# Patient Record
Sex: MALE | URBAN - METROPOLITAN AREA
[De-identification: names, ages, dates, MRNs, and addresses within clinical notes are randomized per-mention and may not be internally consistent; named-entity substitution may affect disease eponyms.]

---

## 2020-11-16 ENCOUNTER — NEW PATIENT (OUTPATIENT)
Dept: URBAN - METROPOLITAN AREA CLINIC 44 | Facility: CLINIC | Age: 80
End: 2020-11-16
Payer: MEDICARE

## 2020-11-16 DIAGNOSIS — H25.813 COMBINED FORMS OF AGE-RELATED CATARACT, BILATERAL: ICD-10-CM

## 2020-11-16 DIAGNOSIS — H16.143 PUNCTATE KERATITIS, BILATERAL: ICD-10-CM

## 2020-11-16 PROCEDURE — 92133 CPTRZD OPH DX IMG PST SGM ON: CPT | Performed by: OPTOMETRIST

## 2020-11-16 PROCEDURE — 92004 COMPRE OPH EXAM NEW PT 1/>: CPT | Performed by: OPTOMETRIST

## 2020-11-16 RX ORDER — LATANOPROST 50 UG/ML
0.005 % SOLUTION OPHTHALMIC
Qty: 1 | Refills: 1 | Status: INACTIVE
Start: 2020-11-16 | End: 2021-01-08

## 2020-11-16 ASSESSMENT — INTRAOCULAR PRESSURE
OD: 27
OS: 21
OS: 24
OD: 26

## 2020-11-16 ASSESSMENT — KERATOMETRY
OD: 42.50
OS: 43.00

## 2020-11-16 ASSESSMENT — VISUAL ACUITY
OD: 20/25
OS: 20/20

## 2020-11-30 ENCOUNTER — NEW PATIENT (OUTPATIENT)
Dept: URBAN - METROPOLITAN AREA CLINIC 44 | Facility: CLINIC | Age: 80
End: 2020-11-30
Payer: MEDICARE

## 2020-11-30 DIAGNOSIS — H35.82 RETINAL ISCHEMIA: ICD-10-CM

## 2020-11-30 DIAGNOSIS — H04.123 TEAR FILM INSUFFICIENCY OF BILATERAL LACRIMAL GLANDS: ICD-10-CM

## 2020-11-30 PROCEDURE — 92020 GONIOSCOPY: CPT | Performed by: OPHTHALMOLOGY

## 2020-11-30 PROCEDURE — 76514 ECHO EXAM OF EYE THICKNESS: CPT | Performed by: OPHTHALMOLOGY

## 2020-11-30 PROCEDURE — 92083 EXTENDED VISUAL FIELD XM: CPT | Performed by: OPHTHALMOLOGY

## 2020-11-30 PROCEDURE — 92004 COMPRE OPH EXAM NEW PT 1/>: CPT | Performed by: OPHTHALMOLOGY

## 2020-11-30 RX ORDER — PREDNISOLONE ACETATE 10 MG/ML
1 % SUSPENSION/ DROPS OPHTHALMIC
Qty: 1 | Refills: 1 | Status: INACTIVE
Start: 2020-11-30 | End: 2021-01-08

## 2020-11-30 RX ORDER — DORZOLAMIDE HYDROCHLORIDE AND TIMOLOL MALEATE 20; 5 MG/ML; MG/ML
SOLUTION/ DROPS OPHTHALMIC
Qty: 3 | Refills: 1 | Status: INACTIVE
Start: 2020-11-30 | End: 2021-01-08

## 2020-11-30 ASSESSMENT — INTRAOCULAR PRESSURE
OS: 20
OD: 23

## 2020-12-04 ENCOUNTER — Encounter (OUTPATIENT)
Dept: URBAN - METROPOLITAN AREA CLINIC 44 | Facility: CLINIC | Age: 80
End: 2020-12-04
Payer: MEDICARE

## 2020-12-04 DIAGNOSIS — Z01.818 ENCOUNTER FOR OTHER PREPROCEDURAL EXAMINATION: Primary | ICD-10-CM

## 2020-12-04 PROCEDURE — 99213 OFFICE O/P EST LOW 20 MIN: CPT | Performed by: PHYSICIAN ASSISTANT

## 2020-12-10 ENCOUNTER — SURGERY (OUTPATIENT)
Dept: URBAN - METROPOLITAN AREA SURGERY 19 | Facility: SURGERY | Age: 80
End: 2020-12-10
Payer: MEDICARE

## 2020-12-10 PROCEDURE — 66761 REVISION OF IRIS: CPT | Performed by: OPHTHALMOLOGY

## 2020-12-17 ENCOUNTER — SURGERY (OUTPATIENT)
Dept: URBAN - METROPOLITAN AREA SURGERY 19 | Facility: SURGERY | Age: 80
End: 2020-12-17
Payer: MEDICARE

## 2020-12-17 PROCEDURE — 66761 REVISION OF IRIS: CPT | Performed by: OPHTHALMOLOGY

## 2021-01-08 ENCOUNTER — FOLLOW UP ESTABLISHED (OUTPATIENT)
Dept: URBAN - METROPOLITAN AREA CLINIC 44 | Facility: CLINIC | Age: 81
End: 2021-01-08
Payer: MEDICARE

## 2021-01-08 PROCEDURE — 92012 INTRM OPH EXAM EST PATIENT: CPT | Performed by: OPHTHALMOLOGY

## 2021-01-08 RX ORDER — DORZOLAMIDE HYDROCHLORIDE AND TIMOLOL MALEATE 20; 5 MG/ML; MG/ML
SOLUTION/ DROPS OPHTHALMIC
Qty: 15 | Refills: 1 | Status: INACTIVE
Start: 2021-01-08 | End: 2021-07-27

## 2021-01-08 RX ORDER — LATANOPROST 50 UG/ML
0.005 % SOLUTION OPHTHALMIC
Qty: 5 | Refills: 1 | Status: INACTIVE
Start: 2021-01-08 | End: 2021-07-27

## 2021-01-08 ASSESSMENT — INTRAOCULAR PRESSURE
OS: 25
OD: 28

## 2021-02-05 ENCOUNTER — FOLLOW UP ESTABLISHED (OUTPATIENT)
Dept: URBAN - METROPOLITAN AREA CLINIC 44 | Facility: CLINIC | Age: 81
End: 2021-02-05
Payer: MEDICARE

## 2021-02-05 PROCEDURE — 92012 INTRM OPH EXAM EST PATIENT: CPT | Performed by: OPHTHALMOLOGY

## 2021-02-05 ASSESSMENT — INTRAOCULAR PRESSURE
OS: 15
OD: 14

## 2021-02-10 ENCOUNTER — NEW PATIENT (OUTPATIENT)
Dept: URBAN - METROPOLITAN AREA CLINIC 44 | Facility: CLINIC | Age: 81
End: 2021-02-10
Payer: MEDICARE

## 2021-02-10 DIAGNOSIS — H40.2234 CHRONIC ANGLE-CLOSURE GLAUCOMA, BILATERAL, INDETERMINATE STAGE: Primary | ICD-10-CM

## 2021-02-10 PROCEDURE — 92134 CPTRZ OPH DX IMG PST SGM RTA: CPT | Performed by: OPHTHALMOLOGY

## 2021-02-10 PROCEDURE — 99212 OFFICE O/P EST SF 10 MIN: CPT | Performed by: OPHTHALMOLOGY

## 2021-02-10 PROCEDURE — 99202 OFFICE O/P NEW SF 15 MIN: CPT | Performed by: OPHTHALMOLOGY

## 2021-02-10 PROCEDURE — 92235 FLUORESCEIN ANGRPH MLTIFRAME: CPT | Performed by: OPHTHALMOLOGY

## 2021-02-10 ASSESSMENT — INTRAOCULAR PRESSURE
OS: 18
OD: 20

## 2021-05-03 ENCOUNTER — OFFICE VISIT (OUTPATIENT)
Dept: URBAN - METROPOLITAN AREA CLINIC 44 | Facility: CLINIC | Age: 81
End: 2021-05-03
Payer: MEDICARE

## 2021-05-03 DIAGNOSIS — H35.042 RETINAL MICRO-ANEURYSMS, UNSPECIFIED, LEFT EYE: ICD-10-CM

## 2021-05-03 PROCEDURE — 99213 OFFICE O/P EST LOW 20 MIN: CPT | Performed by: OPHTHALMOLOGY

## 2021-05-03 ASSESSMENT — INTRAOCULAR PRESSURE
OD: 14
OS: 13

## 2021-05-03 NOTE — IMPRESSION/PLAN
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 
 - DENIES Family Hx of Glaucoma, Sulfa Allergy, Heart or Lung Problems, Sleep Apnea, Hx of Migraines Plan: PLAN: ON Latanoprost QHS OU and Cosopt BID OU; s/p LPI OU; IOP is  mildly improved ou. , so cont meds and  RTC 2-3 months for IOP check/VFT/OCT      ; Discussed mixed mechanism also TESTS: 
11/30/20 Pachs OD) Average thickness and average risk. Pachs OS) Average thickness and average risk. 11/30/20 VF OD) global loss, small central island. VF OS) dense inferior/ nasal depression. Discussed Glaucoma diagnosis in detail with patient. Emphasized and explained compliance. Poor compliance can lead to blindness. BRING YOUR DROPS EVERY VISIT.

## 2021-05-03 NOTE — IMPRESSION/PLAN
Impression: Retinal Microaneurysms, OS. Plan: followed by retina ; Discussed signs and symptoms of retinal detachment (flashes,floaters, curtain) as precaution . Patient instructed to call or return to clinic if condition gets worse.

## 2021-07-23 ENCOUNTER — OFFICE VISIT (OUTPATIENT)
Dept: URBAN - METROPOLITAN AREA CLINIC 44 | Facility: CLINIC | Age: 81
End: 2021-07-23
Payer: MEDICARE

## 2021-07-23 DIAGNOSIS — H40.033 ANATOMICAL NARROW ANGLE, BILATERAL: ICD-10-CM

## 2021-07-23 PROCEDURE — 99214 OFFICE O/P EST MOD 30 MIN: CPT | Performed by: OPHTHALMOLOGY

## 2021-07-23 PROCEDURE — 92133 CPTRZD OPH DX IMG PST SGM ON: CPT | Performed by: OPHTHALMOLOGY

## 2021-07-23 PROCEDURE — 92083 EXTENDED VISUAL FIELD XM: CPT | Performed by: OPHTHALMOLOGY

## 2021-07-23 ASSESSMENT — INTRAOCULAR PRESSURE
OS: 13
OD: 13

## 2021-07-23 NOTE — IMPRESSION/PLAN
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 
 - DENIES Family Hx of Glaucoma, Sulfa Allergy, Heart or Lung Problems, Sleep Apnea, Hx of Migraines Plan: PLAN: ON Latanoprost QHS OU and Cosopt BID OU; s/p LPI OU; VFT is stable ou    , OCT is similar ou    and IOP is stable ou. , so cont meds and  RTC 2-3 months for IOP check    ; Discussed mixed mechanism also TESTS: 
7/23/21 RNFL OCT - OD: Fair-51 (48) superior, inferior and temporal thinning; OS: Fair-51 (51) superior, inferior and temporal thinning 7/23/21 Visual Field - OD: Poor-global loss, small central island; OS: Poor-dense superior nasal and inferior depression 11/30/20 Pachs OD) Average thickness and average risk. Pachs OS) Average thickness and average risk. 11/30/20 VF OD) global loss, small central island. VF OS) dense inferior/ nasal depression. Discussed Glaucoma diagnosis in detail with patient. Emphasized and explained compliance. Poor compliance can lead to blindness. BRING YOUR DROPS EVERY VISIT.

## 2021-10-18 ENCOUNTER — OFFICE VISIT (OUTPATIENT)
Dept: URBAN - METROPOLITAN AREA CLINIC 44 | Facility: CLINIC | Age: 81
End: 2021-10-18
Payer: MEDICARE

## 2021-10-18 PROCEDURE — 99214 OFFICE O/P EST MOD 30 MIN: CPT | Performed by: OPHTHALMOLOGY

## 2021-10-18 RX ORDER — LATANOPROST 50 UG/ML
0.005 % SOLUTION OPHTHALMIC
Qty: 5 | Refills: 1 | Status: ACTIVE
Start: 2021-10-18

## 2021-10-18 RX ORDER — DORZOLAMIDE HYDROCHLORIDE AND TIMOLOL MALEATE 20; 5 MG/ML; MG/ML
SOLUTION/ DROPS OPHTHALMIC
Qty: 15 | Refills: 1 | Status: ACTIVE
Start: 2021-10-18

## 2021-10-18 ASSESSMENT — INTRAOCULAR PRESSURE
OS: 12
OD: 12

## 2021-10-18 NOTE — IMPRESSION/PLAN
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 
 - DENIES Family Hx of Glaucoma, Sulfa Allergy, Heart or Lung Problems, Sleep Apnea, Hx of Migraines Plan: PLAN: ON Latanoprost QHS OU and Cosopt BID OU; s/p LPI OU;  IOP is  holding  ou. , so cont meds and  RTC 2-3 months for IOP check /VFT    ; Discussed mixed mechanism also TESTS: 
7/23/21 RNFL OCT - OD: Fair-51 (48) superior, inferior and temporal thinning; OS: Fair-51 (51) superior, inferior and temporal thinning 7/23/21 Visual Field - OD: Poor-global loss, small central island; OS: Poor-dense superior nasal and inferior depression 11/30/20 Pachs OD) Average thickness and average risk. Pachs OS) Average thickness and average risk. Discussed Glaucoma diagnosis in detail with patient. Emphasized and explained compliance. Poor compliance can lead to blindness. BRING YOUR DROPS EVERY VISIT.

## 2021-10-18 NOTE — IMPRESSION/PLAN
Impression: Combined forms of age-related cataract, bilateral Plan: monitored by general clinic.;  optometry for complete eval

## 2025-02-20 ENCOUNTER — OFFICE VISIT (OUTPATIENT)
Dept: URBAN - METROPOLITAN AREA CLINIC 45 | Facility: CLINIC | Age: 85
End: 2025-02-20
Payer: MEDICARE

## 2025-02-20 DIAGNOSIS — H40.20X4: Primary | ICD-10-CM

## 2025-02-20 DIAGNOSIS — H40.039 ANATOMICAL NARROW ANGLE, UNSPECIFIED EYE: ICD-10-CM

## 2025-02-20 DIAGNOSIS — H25.13 AGE-RELATED NUCLEAR CATARACT, BILATERAL: ICD-10-CM

## 2025-02-20 PROCEDURE — 99204 OFFICE O/P NEW MOD 45 MIN: CPT

## 2025-02-20 PROCEDURE — 92133 CPTRZD OPH DX IMG PST SGM ON: CPT

## 2025-02-20 PROCEDURE — 76514 ECHO EXAM OF EYE THICKNESS: CPT

## 2025-02-20 RX ORDER — NETARSUDIL AND LATANOPROST OPHTHALMIC SOLUTION, 0.02%/0.005% .2; .05 MG/ML; MG/ML
SOLUTION/ DROPS OPHTHALMIC; TOPICAL
Qty: 10 | Refills: 2 | Status: ACTIVE
Start: 2025-02-20

## 2025-02-20 ASSESSMENT — INTRAOCULAR PRESSURE
OD: 16
OS: 18
OS: 19
OD: 17

## 2025-02-20 ASSESSMENT — VISUAL ACUITY: OD: 20/25

## 2025-02-20 ASSESSMENT — KERATOMETRY
OS: 42.88
OD: 42.25